# Patient Record
(demographics unavailable — no encounter records)

---

## 2025-05-14 NOTE — PHYSICAL EXAM
[No Acute Distress] : no acute distress [Well Nourished] : well nourished [Well Developed] : well developed [Well-Appearing] : well-appearing [No Respiratory Distress] : no respiratory distress  [Clear to Auscultation] : lungs were clear to auscultation bilaterally [No Accessory Muscle Use] : no accessory muscle use [Normal Rate] : normal rate  [Regular Rhythm] : with a regular rhythm [No Murmur] : no murmur heard [Pedal Pulses Present] : the pedal pulses are present [No Edema] : there was no peripheral edema [Speech Grossly Normal] : speech grossly normal [Normal Affect] : the affect was normal [Normal Mood] : the mood was normal [Normal Insight/Judgement] : insight and judgment were intact

## 2025-05-14 NOTE — HISTORY OF PRESENT ILLNESS
[Post-hospitalization from ___ Hospital] : Post-hospitalization from [unfilled] Hospital [Admitted on: ___] : The patient was admitted on [unfilled] [Discharged on ___] : discharged on [unfilled] [Discharge Summary] : discharge summary [Discharge Med List] : discharge medication list [Med Reconciliation] : medication reconciliation has been completed [Patient Contacted By: ____] : and contacted by [unfilled] [FreeTextEntry2] : Copied from EMR, "Hospital Course 93 year old female , resident of [facility] , with hx of Dementia, Aortic dissection s/p emergent ascending hemiarch replacement and Aortic valve repair 2020 with Dr Abdi, chronic AF, HTN, breast cancer here with cough. and congestion ,  was also sent couple days ago with similar symptoms and was dx with hMNV. also noted with afib with rvr  >Afib/ afib with rvr > mild HFpEF-cough likely 2/2 viral reactive airway . CXR with hyperinflated lungs and mild increase interstitial markings .< from: TTE W or WO Ultrasound Enhancing Agent (05.03.25 @ 10:58) >Left ventricular cavity is small. Left ventricular systolic function is severely decreased with an ejection fraction of 32 % by Steele's method of disks with an ejection fraction visually estimated at 30 to 35 %. Global left ventricular hypokinesis. Moderate mitral regurgitation. The mitral regurgitant jet is eccentrically directed. MR may be underestimated given eccentric jet and posterior prolapse. Descending aorta is dilated, measuring 3.43 cm (indexed 2.23 cm/m). s/p Lasix 20mg IV x1. now euvolemic. c/w Eliquis. metoprolol dose increased. started on lasix 20 mg po qd. >Viral-bronchitis HMPV + reactive airway. supportive care. Duoneb for wheezing and bronchitis. c/w Zithromax to complete x 5 days.  >Dementia- supportive care >DVT PPX- Eliquis >return back to [place]."  Pt seen in her [facility] w/spouse present. She reports feeling well, mild residua cough, no SOB, fever, chills or LE edema. Taking Mucinex as needed for cough.